# Patient Record
Sex: MALE | Race: WHITE
[De-identification: names, ages, dates, MRNs, and addresses within clinical notes are randomized per-mention and may not be internally consistent; named-entity substitution may affect disease eponyms.]

---

## 2019-04-09 ENCOUNTER — HOSPITAL ENCOUNTER (OUTPATIENT)
Dept: HOSPITAL 15 - RAD HDHVI | Age: 84
Discharge: HOME | End: 2019-04-09
Attending: INTERNAL MEDICINE
Payer: MEDICARE

## 2019-04-09 DIAGNOSIS — D51.9: ICD-10-CM

## 2019-04-09 DIAGNOSIS — E29.1: ICD-10-CM

## 2019-04-09 DIAGNOSIS — N39.0: ICD-10-CM

## 2019-04-09 DIAGNOSIS — E55.9: ICD-10-CM

## 2019-04-09 DIAGNOSIS — Z13.1: Primary | ICD-10-CM

## 2019-04-09 DIAGNOSIS — C61: ICD-10-CM

## 2019-04-09 DIAGNOSIS — E03.9: ICD-10-CM

## 2019-04-09 DIAGNOSIS — I08.0: ICD-10-CM

## 2019-04-09 PROCEDURE — 78452 HT MUSCLE IMAGE SPECT MULT: CPT

## 2019-04-09 PROCEDURE — 82306 VITAMIN D 25 HYDROXY: CPT

## 2019-04-09 PROCEDURE — 96375 TX/PRO/DX INJ NEW DRUG ADDON: CPT

## 2019-04-09 PROCEDURE — 85025 COMPLETE CBC W/AUTO DIFF WBC: CPT

## 2019-04-09 PROCEDURE — 82607 VITAMIN B-12: CPT

## 2019-04-09 PROCEDURE — 84403 ASSAY OF TOTAL TESTOSTERONE: CPT

## 2019-04-09 PROCEDURE — 93005 ELECTROCARDIOGRAM TRACING: CPT

## 2019-04-09 PROCEDURE — 84443 ASSAY THYROID STIM HORMONE: CPT

## 2019-04-09 PROCEDURE — 83036 HEMOGLOBIN GLYCOSYLATED A1C: CPT

## 2019-04-09 PROCEDURE — 84154 ASSAY OF PSA FREE: CPT

## 2019-04-09 PROCEDURE — 81003 URINALYSIS AUTO W/O SCOPE: CPT

## 2019-04-09 PROCEDURE — 80053 COMPREHEN METABOLIC PANEL: CPT

## 2019-04-09 PROCEDURE — 36415 COLL VENOUS BLD VENIPUNCTURE: CPT

## 2019-04-09 PROCEDURE — 96374 THER/PROPH/DIAG INJ IV PUSH: CPT

## 2019-04-09 PROCEDURE — 84153 ASSAY OF PSA TOTAL: CPT

## 2019-04-09 PROCEDURE — 84439 ASSAY OF FREE THYROXINE: CPT

## 2019-04-09 PROCEDURE — 93306 TTE W/DOPPLER COMPLETE: CPT

## 2019-04-09 PROCEDURE — 80061 LIPID PANEL: CPT

## 2019-04-10 LAB
ALBUMIN SERPL-MCNC: 3.8 G/DL (ref 3.4–5)
ALP SERPL-CCNC: 134 U/L (ref 45–117)
ALT SERPL-CCNC: 8 U/L (ref 16–61)
ANION GAP SERPL CALCULATED.3IONS-SCNC: 11 MMOL/L (ref 5–15)
BILIRUB SERPL-MCNC: 1.4 MG/DL (ref 0.2–1)
BUN SERPL-MCNC: 32 MG/DL (ref 7–18)
BUN/CREAT SERPL: 21.1
CALCIUM SERPL-MCNC: 8.8 MG/DL (ref 8.5–10.1)
CHLORIDE SERPL-SCNC: 102 MMOL/L (ref 98–107)
CHOLEST SERPL-MCNC: 115 MG/DL (ref ?–200)
CO2 SERPL-SCNC: 25 MMOL/L (ref 21–32)
GLUCOSE SERPL-MCNC: 86 MG/DL (ref 74–106)
HCT VFR BLD AUTO: 45 % (ref 41–53)
HDLC SERPL-MCNC: 49 MG/DL (ref 40–59)
HGB BLD-MCNC: 14.6 G/DL (ref 13.5–17.5)
MCH RBC QN AUTO: 32.5 PG (ref 28–32)
MCV RBC AUTO: 100.1 FL (ref 80–100)
NRBC BLD QL AUTO: 0.1 %
POTASSIUM SERPL-SCNC: 4.2 MMOL/L (ref 3.5–5.1)
PROT SERPL-MCNC: 7.2 G/DL (ref 6.4–8.2)
PSA SERPL-MCNC: 9.99 NG/ML (ref 0–4)
SODIUM SERPL-SCNC: 138 MMOL/L (ref 136–145)
T4 FREE SERPL-MCNC: 1.28 NG/DL (ref 0.89–1.76)
TRIGL SERPL-MCNC: 64 MG/DL (ref ?–150)

## 2019-06-14 ENCOUNTER — HOSPITAL ENCOUNTER (INPATIENT)
Dept: HOSPITAL 15 - ER | Age: 84
LOS: 7 days | Discharge: HOME | End: 2019-06-21
Payer: MEDICARE

## 2019-06-14 DIAGNOSIS — G20: ICD-10-CM

## 2019-06-14 DIAGNOSIS — A41.9: Primary | ICD-10-CM

## 2019-06-14 DIAGNOSIS — E86.9: ICD-10-CM

## 2019-06-14 DIAGNOSIS — I95.9: ICD-10-CM

## 2019-06-14 DIAGNOSIS — N39.0: ICD-10-CM

## 2019-06-14 DIAGNOSIS — I48.91: ICD-10-CM

## 2019-06-14 DIAGNOSIS — N17.9: ICD-10-CM

## 2019-06-14 DIAGNOSIS — G90.9: ICD-10-CM

## 2019-07-06 ENCOUNTER — HOSPITAL ENCOUNTER (INPATIENT)
Dept: HOSPITAL 15 - ER | Age: 84
LOS: 5 days | Discharge: HOME HEALTH | DRG: 871 | End: 2019-07-11
Attending: FAMILY MEDICINE | Admitting: INTERNAL MEDICINE
Payer: MEDICARE

## 2019-07-06 VITALS — SYSTOLIC BLOOD PRESSURE: 159 MMHG | DIASTOLIC BLOOD PRESSURE: 94 MMHG

## 2019-07-06 VITALS — BODY MASS INDEX: 25.93 KG/M2 | HEIGHT: 69 IN | WEIGHT: 175.05 LBS

## 2019-07-06 DIAGNOSIS — N39.0: ICD-10-CM

## 2019-07-06 DIAGNOSIS — F02.80: ICD-10-CM

## 2019-07-06 DIAGNOSIS — Z79.01: ICD-10-CM

## 2019-07-06 DIAGNOSIS — Z80.1: ICD-10-CM

## 2019-07-06 DIAGNOSIS — Z90.49: ICD-10-CM

## 2019-07-06 DIAGNOSIS — N40.0: ICD-10-CM

## 2019-07-06 DIAGNOSIS — I70.0: ICD-10-CM

## 2019-07-06 DIAGNOSIS — I11.0: ICD-10-CM

## 2019-07-06 DIAGNOSIS — Z80.3: ICD-10-CM

## 2019-07-06 DIAGNOSIS — N32.0: ICD-10-CM

## 2019-07-06 DIAGNOSIS — A41.9: Primary | ICD-10-CM

## 2019-07-06 DIAGNOSIS — G93.41: ICD-10-CM

## 2019-07-06 DIAGNOSIS — G20: ICD-10-CM

## 2019-07-06 DIAGNOSIS — J44.9: ICD-10-CM

## 2019-07-06 DIAGNOSIS — I48.91: ICD-10-CM

## 2019-07-06 DIAGNOSIS — Z74.01: ICD-10-CM

## 2019-07-06 DIAGNOSIS — K46.9: ICD-10-CM

## 2019-07-06 DIAGNOSIS — E86.9: ICD-10-CM

## 2019-07-06 DIAGNOSIS — I50.9: ICD-10-CM

## 2019-07-06 DIAGNOSIS — N13.8: ICD-10-CM

## 2019-07-06 DIAGNOSIS — N40.1: ICD-10-CM

## 2019-07-06 DIAGNOSIS — I25.2: ICD-10-CM

## 2019-07-06 DIAGNOSIS — Z86.73: ICD-10-CM

## 2019-07-06 DIAGNOSIS — Z82.49: ICD-10-CM

## 2019-07-06 DIAGNOSIS — Z88.5: ICD-10-CM

## 2019-07-06 LAB
ALBUMIN SERPL-MCNC: 3.4 G/DL (ref 3.4–5)
ALP SERPL-CCNC: 121 U/L (ref 45–117)
ALT SERPL-CCNC: 6 U/L (ref 16–61)
ANION GAP SERPL CALCULATED.3IONS-SCNC: 7 MMOL/L (ref 5–15)
APTT PPP: 31.9 SEC (ref 23.64–32.05)
BILIRUB SERPL-MCNC: 1.9 MG/DL (ref 0.2–1)
BUN SERPL-MCNC: 23 MG/DL (ref 7–18)
BUN/CREAT SERPL: 14.6
CALCIUM SERPL-MCNC: 8.8 MG/DL (ref 8.5–10.1)
CHLORIDE SERPL-SCNC: 108 MMOL/L (ref 98–107)
CO2 SERPL-SCNC: 21 MMOL/L (ref 21–32)
GLUCOSE SERPL-MCNC: 81 MG/DL (ref 74–106)
HCT VFR BLD AUTO: 44 % (ref 41–53)
HGB BLD-MCNC: 14.4 G/DL (ref 13.5–17.5)
INR PPP: 1.04 (ref 0.9–1.15)
MAGNESIUM SERPL-MCNC: 2.2 MG/DL (ref 1.6–2.6)
MCH RBC QN AUTO: 32 PG (ref 28–32)
MCV RBC AUTO: 97.9 FL (ref 80–100)
NRBC BLD QL AUTO: 0 %
POTASSIUM SERPL-SCNC: 4.2 MMOL/L (ref 3.5–5.1)
PROT SERPL-MCNC: 6.6 G/DL (ref 6.4–8.2)
SODIUM SERPL-SCNC: 136 MMOL/L (ref 136–145)
WBC CLUMPS UR QL AUTO: PRESENT /HPF

## 2019-07-06 PROCEDURE — 85610 PROTHROMBIN TIME: CPT

## 2019-07-06 PROCEDURE — 94761 N-INVAS EAR/PLS OXIMETRY MLT: CPT

## 2019-07-06 PROCEDURE — 80053 COMPREHEN METABOLIC PANEL: CPT

## 2019-07-06 PROCEDURE — 87086 URINE CULTURE/COLONY COUNT: CPT

## 2019-07-06 PROCEDURE — 74176 CT ABD & PELVIS W/O CONTRAST: CPT

## 2019-07-06 PROCEDURE — 71045 X-RAY EXAM CHEST 1 VIEW: CPT

## 2019-07-06 PROCEDURE — 82962 GLUCOSE BLOOD TEST: CPT

## 2019-07-06 PROCEDURE — 83605 ASSAY OF LACTIC ACID: CPT

## 2019-07-06 PROCEDURE — 87040 BLOOD CULTURE FOR BACTERIA: CPT

## 2019-07-06 PROCEDURE — 85730 THROMBOPLASTIN TIME PARTIAL: CPT

## 2019-07-06 PROCEDURE — 84484 ASSAY OF TROPONIN QUANT: CPT

## 2019-07-06 PROCEDURE — 93005 ELECTROCARDIOGRAM TRACING: CPT

## 2019-07-06 PROCEDURE — 83735 ASSAY OF MAGNESIUM: CPT

## 2019-07-06 PROCEDURE — 96374 THER/PROPH/DIAG INJ IV PUSH: CPT

## 2019-07-06 PROCEDURE — 85025 COMPLETE CBC W/AUTO DIFF WBC: CPT

## 2019-07-06 PROCEDURE — 81001 URINALYSIS AUTO W/SCOPE: CPT

## 2019-07-06 PROCEDURE — 51702 INSERT TEMP BLADDER CATH: CPT

## 2019-07-06 PROCEDURE — 36415 COLL VENOUS BLD VENIPUNCTURE: CPT

## 2019-07-06 PROCEDURE — 70450 CT HEAD/BRAIN W/O DYE: CPT

## 2019-07-06 NOTE — NUR
Patient's wife called, after giving the password she was updated on patient's status. She 
provided the list of medications the patient currently takes at home to complete the 
medication reconciliation.

## 2019-07-06 NOTE — NUR
Telemetry admit from [facility] admitted to Telemetry unit after SBAR received from Komal.  Patient oriented 
to JED COBIAN RN primary RN, unit, room, bed, and unit policies regarding patient care 
and visiting hours. Patient now on continuous telemetry monitoring, tele box # 28 and 
telemetry reading on arrival to unit is  A-fib. Pandey is in place, hung below bladder and 
draining candelario colored urine with blood noted. Patient is alert to self only, no complaints 
of pain or S/S of SOB or distress. Sitter is at bedside for safety precautions. Patient 
weighed by bedscale and encouraged to call if they need something. All questions and 
concerns addressed, patient verbalized understanding.

## 2019-07-06 NOTE — NUR
I called after hours pharmacy because the medications were not showing up in the medication 
pyxis and was told it was an IT issue.

## 2019-07-06 NOTE — NUR
Renewed the medications on the orders and called after hours pharmacy to ask them to verify 
the orders, they stated they will work on it but it may take a bit because they are helping 
a few people before me who are also having issues.

## 2019-07-07 VITALS — SYSTOLIC BLOOD PRESSURE: 132 MMHG | DIASTOLIC BLOOD PRESSURE: 75 MMHG

## 2019-07-07 VITALS — DIASTOLIC BLOOD PRESSURE: 85 MMHG | SYSTOLIC BLOOD PRESSURE: 133 MMHG

## 2019-07-07 VITALS — SYSTOLIC BLOOD PRESSURE: 139 MMHG | DIASTOLIC BLOOD PRESSURE: 69 MMHG

## 2019-07-07 VITALS — SYSTOLIC BLOOD PRESSURE: 121 MMHG | DIASTOLIC BLOOD PRESSURE: 66 MMHG

## 2019-07-07 VITALS — DIASTOLIC BLOOD PRESSURE: 81 MMHG | SYSTOLIC BLOOD PRESSURE: 140 MMHG

## 2019-07-07 RX ADMIN — HYDROCODONE BITARTRATE AND ACETAMINOPHEN SCH TAB: 5; 325 TABLET ORAL at 14:57

## 2019-07-07 RX ADMIN — PANTOPRAZOLE SODIUM SCH MG: 40 TABLET, DELAYED RELEASE ORAL at 05:49

## 2019-07-07 RX ADMIN — METOPROLOL SUCCINATE SCH MG: 50 TABLET, EXTENDED RELEASE ORAL at 10:34

## 2019-07-07 RX ADMIN — HYDROCODONE BITARTRATE AND ACETAMINOPHEN SCH TAB: 5; 325 TABLET ORAL at 05:46

## 2019-07-07 RX ADMIN — MEGESTROL ACETATE SCH MG: 20 TABLET ORAL at 21:29

## 2019-07-07 RX ADMIN — AMIODARONE HYDROCHLORIDE SCH MG: 200 TABLET ORAL at 10:00

## 2019-07-07 RX ADMIN — HYDROCODONE BITARTRATE AND ACETAMINOPHEN SCH TAB: 5; 325 TABLET ORAL at 21:30

## 2019-07-07 RX ADMIN — ALLOPURINOL SCH MG: 100 TABLET ORAL at 10:34

## 2019-07-07 RX ADMIN — PRAVASTATIN SODIUM SCH MG: 20 TABLET ORAL at 21:30

## 2019-07-07 RX ADMIN — Medication SCH TAB: at 05:47

## 2019-07-07 RX ADMIN — CEFTRIAXONE SODIUM SCH MLS/HR: 1 INJECTION, POWDER, FOR SOLUTION INTRAMUSCULAR; INTRAVENOUS at 18:55

## 2019-07-07 RX ADMIN — MEGESTROL ACETATE SCH MG: 20 TABLET ORAL at 10:00

## 2019-07-07 RX ADMIN — Medication SCH TAB: at 18:00

## 2019-07-07 RX ADMIN — PRAMIPEXOLE DIHYDROCHLORIDE SCH MG: 0.25 TABLET ORAL at 21:29

## 2019-07-07 RX ADMIN — TAMSULOSIN HYDROCHLORIDE SCH MG: 0.4 CAPSULE ORAL at 10:00

## 2019-07-07 RX ADMIN — FINASTERIDE SCH MG: 5 TABLET, FILM COATED ORAL at 10:33

## 2019-07-07 RX ADMIN — Medication SCH TAB: at 21:29

## 2019-07-07 RX ADMIN — Medication SCH TAB: at 12:37

## 2019-07-07 NOTE — NUR
Two wound care photos taken of right arm. Paperwork was placed in central because I used the 
central wing wound care camera.

## 2019-07-07 NOTE — NUR
Opening Shift Note

Assumed care of patient, awake and alert to self. Sitter is at bedside for safety. Pandey is 
in place, hung below the bladder and draining bloody urine. No S/S of distress/SOB or pain. 
Instructed on POC and to call for assist PRN, will continue to monitor for changes Q1hr and 
PRN.

## 2019-07-08 VITALS — SYSTOLIC BLOOD PRESSURE: 155 MMHG | DIASTOLIC BLOOD PRESSURE: 90 MMHG

## 2019-07-08 VITALS — SYSTOLIC BLOOD PRESSURE: 126 MMHG | DIASTOLIC BLOOD PRESSURE: 90 MMHG

## 2019-07-08 VITALS — SYSTOLIC BLOOD PRESSURE: 104 MMHG | DIASTOLIC BLOOD PRESSURE: 59 MMHG

## 2019-07-08 VITALS — SYSTOLIC BLOOD PRESSURE: 146 MMHG | DIASTOLIC BLOOD PRESSURE: 73 MMHG

## 2019-07-08 VITALS — DIASTOLIC BLOOD PRESSURE: 62 MMHG | SYSTOLIC BLOOD PRESSURE: 128 MMHG

## 2019-07-08 RX ADMIN — HYDROCODONE BITARTRATE AND ACETAMINOPHEN SCH TAB: 5; 325 TABLET ORAL at 06:00

## 2019-07-08 RX ADMIN — HYDROCODONE BITARTRATE AND ACETAMINOPHEN SCH TAB: 5; 325 TABLET ORAL at 21:47

## 2019-07-08 RX ADMIN — Medication SCH TAB: at 21:44

## 2019-07-08 RX ADMIN — PRAVASTATIN SODIUM SCH MG: 20 TABLET ORAL at 21:44

## 2019-07-08 RX ADMIN — PRAMIPEXOLE DIHYDROCHLORIDE SCH MG: 0.25 TABLET ORAL at 21:43

## 2019-07-08 RX ADMIN — HYDROCODONE BITARTRATE AND ACETAMINOPHEN SCH TAB: 5; 325 TABLET ORAL at 13:03

## 2019-07-08 RX ADMIN — MEGESTROL ACETATE SCH MG: 20 TABLET ORAL at 10:44

## 2019-07-08 RX ADMIN — AMIODARONE HYDROCHLORIDE SCH MG: 200 TABLET ORAL at 10:43

## 2019-07-08 RX ADMIN — FINASTERIDE SCH MG: 5 TABLET, FILM COATED ORAL at 10:44

## 2019-07-08 RX ADMIN — SODIUM CHLORIDE SCH MLS/HR: 0.9 INJECTION, SOLUTION INTRAVENOUS at 13:03

## 2019-07-08 RX ADMIN — METOPROLOL SUCCINATE SCH MG: 50 TABLET, EXTENDED RELEASE ORAL at 10:46

## 2019-07-08 RX ADMIN — Medication SCH TAB: at 13:02

## 2019-07-08 RX ADMIN — Medication SCH TAB: at 18:34

## 2019-07-08 RX ADMIN — ALLOPURINOL SCH MG: 100 TABLET ORAL at 10:44

## 2019-07-08 RX ADMIN — PANTOPRAZOLE SODIUM SCH MG: 40 TABLET, DELAYED RELEASE ORAL at 06:26

## 2019-07-08 RX ADMIN — Medication SCH TAB: at 06:24

## 2019-07-08 RX ADMIN — CEFTRIAXONE SODIUM SCH MLS/HR: 1 INJECTION, POWDER, FOR SOLUTION INTRAMUSCULAR; INTRAVENOUS at 10:43

## 2019-07-08 RX ADMIN — SODIUM CHLORIDE SCH MLS/HR: 0.9 INJECTION, SOLUTION INTRAVENOUS at 01:35

## 2019-07-08 RX ADMIN — TAMSULOSIN HYDROCHLORIDE SCH MG: 0.4 CAPSULE ORAL at 10:45

## 2019-07-08 RX ADMIN — MEGESTROL ACETATE SCH MG: 20 TABLET ORAL at 21:43

## 2019-07-08 NOTE — NUR
HYGIENE CARE PROVIDED. 

HYDRA-GUARD WITH OPTIFOAM DRESSING APPLIED TO SACRAL BLANCHABLE ERYTHEMA (BONY PROMINENCE). 

COMFORTABLE ENVIRONMENT PROVIDED, CALL LIGHT WITHIN REACH. SITTER AT BEDSIDE.

## 2019-07-08 NOTE — NUR
PT AWAKE, ALERT, ORIENTED TO PERSON, , PLACE. PT DENIES DISCOMFORT AT MOMENT. EFFORTLESS 
BREATHING ON ROOM AIR. IV PRESENT AND INFUSING WELL TO LFA#22. VIRAMONTES CATHETER IN PLACE, 
SECURED, FREE OF KINKS, PATENT WITH DARK JEYSON URINE IN COLLECTING BAG. BED LOCKED AND IN 
LOWEST POSITION, CALL LIGHT WITHIN REACH, PT UNDER CLOSE OBSERVATION. WILL CONTINUE TO 
MONITOR.

## 2019-07-08 NOTE — NUR
assessment

Patient is a 83 year old male who is confused. Per patients wife Ngozi patient lived home 
with her and their son prior to admission. Per Ngozi she and her son assist patient at home. 
 Per Ngozi patient has a hospital bed, wheelchair, and fww for home use. Patients PCP is Dr Gracia. Patient is on service with [facility]. Patient will need a resumption 
order for home health on discharge. Ngozi verbalized understanding and agreed to discharge 
plan home on discharge. 

-------------------------------------------------------------------------------

Addendum: 07/10/19 at 1600 by Erica DANIELLE

-------------------------------------------------------------------------------

Amended: Links added.

## 2019-07-09 VITALS — SYSTOLIC BLOOD PRESSURE: 131 MMHG | DIASTOLIC BLOOD PRESSURE: 78 MMHG

## 2019-07-09 VITALS — SYSTOLIC BLOOD PRESSURE: 105 MMHG | DIASTOLIC BLOOD PRESSURE: 49 MMHG

## 2019-07-09 VITALS — SYSTOLIC BLOOD PRESSURE: 155 MMHG | DIASTOLIC BLOOD PRESSURE: 77 MMHG

## 2019-07-09 VITALS — SYSTOLIC BLOOD PRESSURE: 153 MMHG | DIASTOLIC BLOOD PRESSURE: 78 MMHG

## 2019-07-09 VITALS — DIASTOLIC BLOOD PRESSURE: 58 MMHG | SYSTOLIC BLOOD PRESSURE: 112 MMHG

## 2019-07-09 RX ADMIN — AMIODARONE HYDROCHLORIDE SCH MG: 200 TABLET ORAL at 10:00

## 2019-07-09 RX ADMIN — CEFTRIAXONE SODIUM SCH MLS/HR: 1 INJECTION, POWDER, FOR SOLUTION INTRAMUSCULAR; INTRAVENOUS at 12:13

## 2019-07-09 RX ADMIN — METOPROLOL SUCCINATE SCH MG: 50 TABLET, EXTENDED RELEASE ORAL at 10:00

## 2019-07-09 RX ADMIN — PANTOPRAZOLE SODIUM SCH MG: 40 TABLET, DELAYED RELEASE ORAL at 06:53

## 2019-07-09 RX ADMIN — Medication SCH TAB: at 18:18

## 2019-07-09 RX ADMIN — TAMSULOSIN HYDROCHLORIDE SCH MG: 0.4 CAPSULE ORAL at 10:00

## 2019-07-09 RX ADMIN — HYDROCODONE BITARTRATE AND ACETAMINOPHEN SCH TAB: 5; 325 TABLET ORAL at 21:27

## 2019-07-09 RX ADMIN — ALLOPURINOL SCH MG: 100 TABLET ORAL at 12:15

## 2019-07-09 RX ADMIN — PRAVASTATIN SODIUM SCH MG: 20 TABLET ORAL at 21:37

## 2019-07-09 RX ADMIN — MEGESTROL ACETATE SCH MG: 20 TABLET ORAL at 12:14

## 2019-07-09 RX ADMIN — Medication SCH TAB: at 12:15

## 2019-07-09 RX ADMIN — SODIUM CHLORIDE SCH MLS/HR: 0.9 INJECTION, SOLUTION INTRAVENOUS at 17:31

## 2019-07-09 RX ADMIN — Medication SCH TAB: at 06:53

## 2019-07-09 RX ADMIN — Medication SCH TAB: at 21:26

## 2019-07-09 RX ADMIN — SODIUM CHLORIDE SCH MLS/HR: 0.9 INJECTION, SOLUTION INTRAVENOUS at 03:57

## 2019-07-09 RX ADMIN — PRAMIPEXOLE DIHYDROCHLORIDE SCH MG: 0.25 TABLET ORAL at 21:37

## 2019-07-09 RX ADMIN — FINASTERIDE SCH MG: 5 TABLET, FILM COATED ORAL at 12:14

## 2019-07-09 RX ADMIN — HYDROCODONE BITARTRATE AND ACETAMINOPHEN SCH TAB: 5; 325 TABLET ORAL at 14:00

## 2019-07-09 RX ADMIN — HYDROCODONE BITARTRATE AND ACETAMINOPHEN SCH TAB: 5; 325 TABLET ORAL at 06:00

## 2019-07-09 RX ADMIN — MEGESTROL ACETATE SCH MG: 20 TABLET ORAL at 21:26

## 2019-07-09 NOTE — NUR
Per  consult for home health. Information and choice letter was given to Pt. Pt requested 
[facility]. Pt agrees and understand d/c plan. Contacted [facility] Ph: ( [phone]) Fax: 
( [phone]) faxed medical records. Per John from [facility] Pt has been accepted and 
service to start within 48 hours upon d/c. Informed LAISHA Millan. 

-------------------------------------------------------------------------------

Addendum: 07/09/19 at 1723 by MUSA SMITH 

-------------------------------------------------------------------------------

Amended: Links added.

## 2019-07-09 NOTE — NUR
Opening Shift Note

Assumed care of patient, awake, alert and oriented X2. No S/S of distress/SOB or pain. Tele# 
28, sinus rhythm @ 95 bpm. IV to left forearm, 22 gauge, patent and infusing 0.9% NS @ 75 
ml/hr. Urethral Pandey catheter, draining clear, yellow urine to gravity. Instructed on POC 
and to call for assist PRN, verbalized understanding. Patient is aspiration precautions. Bed 
locked, in lowest position, call light within reach, will continue to monitor for changes 
Q1hr and PRN.

## 2019-07-09 NOTE — NUR
Opening Shift Note

Assumed care of patient, awake and alert.  No S/S of distress/SOB or pain.  Instructed on 
POC and to call for assist PRN, will continue to monitor for changes Q1hr and PRN.Pandey 
cath. in placed draining yellow urine output min. hematuria.

## 2019-07-10 VITALS — DIASTOLIC BLOOD PRESSURE: 68 MMHG | SYSTOLIC BLOOD PRESSURE: 117 MMHG

## 2019-07-10 VITALS — SYSTOLIC BLOOD PRESSURE: 137 MMHG | DIASTOLIC BLOOD PRESSURE: 76 MMHG

## 2019-07-10 VITALS — DIASTOLIC BLOOD PRESSURE: 78 MMHG | SYSTOLIC BLOOD PRESSURE: 130 MMHG

## 2019-07-10 VITALS — DIASTOLIC BLOOD PRESSURE: 80 MMHG | SYSTOLIC BLOOD PRESSURE: 140 MMHG

## 2019-07-10 VITALS — DIASTOLIC BLOOD PRESSURE: 74 MMHG | SYSTOLIC BLOOD PRESSURE: 133 MMHG

## 2019-07-10 RX ADMIN — HYDROCODONE BITARTRATE AND ACETAMINOPHEN SCH TAB: 5; 325 TABLET ORAL at 05:31

## 2019-07-10 RX ADMIN — MEGESTROL ACETATE SCH MG: 20 TABLET ORAL at 10:02

## 2019-07-10 RX ADMIN — SODIUM CHLORIDE SCH MLS/HR: 0.9 INJECTION, SOLUTION INTRAVENOUS at 22:45

## 2019-07-10 RX ADMIN — AMIODARONE HYDROCHLORIDE SCH MG: 200 TABLET ORAL at 10:02

## 2019-07-10 RX ADMIN — TAMSULOSIN HYDROCHLORIDE SCH MG: 0.4 CAPSULE ORAL at 10:02

## 2019-07-10 RX ADMIN — Medication SCH TAB: at 05:30

## 2019-07-10 RX ADMIN — PRAMIPEXOLE DIHYDROCHLORIDE SCH MG: 0.25 TABLET ORAL at 22:38

## 2019-07-10 RX ADMIN — MEGESTROL ACETATE SCH MG: 20 TABLET ORAL at 22:38

## 2019-07-10 RX ADMIN — ALLOPURINOL SCH MG: 100 TABLET ORAL at 10:05

## 2019-07-10 RX ADMIN — HYDROCODONE BITARTRATE AND ACETAMINOPHEN SCH TAB: 5; 325 TABLET ORAL at 13:32

## 2019-07-10 RX ADMIN — Medication SCH TAB: at 22:39

## 2019-07-10 RX ADMIN — Medication SCH TAB: at 18:17

## 2019-07-10 RX ADMIN — SODIUM CHLORIDE SCH MLS/HR: 0.9 INJECTION, SOLUTION INTRAVENOUS at 06:03

## 2019-07-10 RX ADMIN — PRAVASTATIN SODIUM SCH MG: 20 TABLET ORAL at 22:38

## 2019-07-10 RX ADMIN — Medication SCH TAB: at 12:14

## 2019-07-10 RX ADMIN — METOPROLOL SUCCINATE SCH MG: 50 TABLET, EXTENDED RELEASE ORAL at 10:03

## 2019-07-10 RX ADMIN — PANTOPRAZOLE SODIUM SCH MG: 40 TABLET, DELAYED RELEASE ORAL at 05:30

## 2019-07-10 RX ADMIN — CEFTRIAXONE SODIUM SCH MLS/HR: 1 INJECTION, POWDER, FOR SOLUTION INTRAMUSCULAR; INTRAVENOUS at 10:01

## 2019-07-10 RX ADMIN — HYDROCODONE BITARTRATE AND ACETAMINOPHEN SCH TAB: 5; 325 TABLET ORAL at 22:39

## 2019-07-10 RX ADMIN — FINASTERIDE SCH MG: 5 TABLET, FILM COATED ORAL at 10:02

## 2019-07-10 NOTE — NUR
Nutrition Assessment Notes



please see attached link for complete assessment



Est. Needs BW [id number] kcal (25-30 kcal/kgBW),  60-75 gms pro (0.8-1.0 gms/kgBW r/t 
elev RFT). Will continue to monitor pertinent labs and reassess nutrient need prn




-------------------------------------------------------------------------------

Addendum: 07/10/19 at 1218 by Pamela Preston RD

-------------------------------------------------------------------------------

Amended: Links added.

## 2019-07-10 NOTE — NUR
IV removal

IV DC'd to left forearm with sterile technique, catheter fully intact. Pressure dressing 
applied to site.Patient tolerated procedure well.  Discharged with aftercare instructions 
per MD.

IV insertion

IV access obtained, via clean sterile technique by inserting 22 gauge catheter at right hand 
after 3 attempt(s). IV secured properly. No trauma to site. Patient tolerated procedure 
well.

## 2019-07-10 NOTE — NUR
MD Called/paged

Dr. Gracia called re:request for breathing treatment order for wheezing hx of copd saturation 
95%on 2L NC . Waiting for call back. Continue care.

## 2019-07-10 NOTE — NUR
Opening Shift Note

Assumed care of patient, awake, alert and oriented X2, to self and year. No S/S of 
distress/SOB or pain. Tele# 28, A-Fib @ 76 bpm. IV to left forearm, 22 gauge, patent and 
infusing 0.9$ NS @ 75 ml/hr. Urethral Pandey catheter draining clear, light candelario urine to 
gravity. Instructed on POC and to call for assist PRN, verbalized understanding but requires 
frequent reminding and reorienting. Bed locked, in lowest position, call light within reach, 
sitter remains at bedside for patient safety, will continue to monitor for changes Q1hr and 
PRN.

## 2019-07-10 NOTE — NUR
UROLOGY

JENELLE Orlando at bedside for Urology consult, new orders received and followed through. Patient 
updated on plan of care, verbalized understanding.

## 2019-07-10 NOTE — NUR
WOUND CARE NOTE: ADDED PATIENT TO SKIN INTEGRITY MONITORING AT THIS TIME, D/T LOW BRENT 
SCOR 11. PATIENT WAS ADMITED TO [facility] WITH DIAGNOSIS OF HEMATURIA.  CURRENT BRENT SCORE IS 
11.  PATIENT RECENTLY AMBULATED IN HALLWAY WITH ASSISTANCE BY PHYSICAL THERAPY. HE IS WOUND 
FREE AT THIS TIME.  SKIN/WOUND CARE PLAN IMPLEMENTED.  PATIENT SHOULD BE TURNED/REPOSITIONED 
Q 2 HOURS, PRN WHEN IN BED, WITH PRESSURE REDISTRIBUTION USING PILLOWS/WEDGES, BID/PRN 
APPLICATION WITH MOISTURE BARRIER CREAM, OPTIFOAM GENTLE SACRAL DRESSING AS PREVENTATIVE, 
SKIN/WOUND CARE PLAN, DIETARY CONSULT FOR LOW BRENT 11, CONTINUED MONITORING BY WOUND CARE 
TEAM.

## 2019-07-10 NOTE — NUR
ROUNDS

Dr RAJESH Martinez at bedside for rounds. New orders received and followed through. Patient and wife 
at bedside updated on plan of care, verbalized understanding.

## 2019-07-10 NOTE — NUR
ABD/PELVIC CT

Awaiting abd/pelvic CT results completed @ [id number]. Spoke to Radha in radiology, informed we 
need the report, per Radha, she will inform the US tech to contact the department.

## 2019-07-11 VITALS — SYSTOLIC BLOOD PRESSURE: 153 MMHG | DIASTOLIC BLOOD PRESSURE: 88 MMHG

## 2019-07-11 VITALS — SYSTOLIC BLOOD PRESSURE: 163 MMHG | DIASTOLIC BLOOD PRESSURE: 85 MMHG

## 2019-07-11 VITALS — DIASTOLIC BLOOD PRESSURE: 72 MMHG | SYSTOLIC BLOOD PRESSURE: 124 MMHG

## 2019-07-11 RX ADMIN — METOPROLOL SUCCINATE SCH MG: 50 TABLET, EXTENDED RELEASE ORAL at 09:15

## 2019-07-11 RX ADMIN — HYDROCODONE BITARTRATE AND ACETAMINOPHEN SCH TAB: 5; 325 TABLET ORAL at 06:00

## 2019-07-11 RX ADMIN — CEFTRIAXONE SODIUM SCH MLS/HR: 1 INJECTION, POWDER, FOR SOLUTION INTRAMUSCULAR; INTRAVENOUS at 09:14

## 2019-07-11 RX ADMIN — PANTOPRAZOLE SODIUM SCH MG: 40 TABLET, DELAYED RELEASE ORAL at 06:43

## 2019-07-11 RX ADMIN — Medication SCH TAB: at 14:30

## 2019-07-11 RX ADMIN — SODIUM CHLORIDE SCH MLS/HR: 0.9 INJECTION, SOLUTION INTRAVENOUS at 06:44

## 2019-07-11 RX ADMIN — AMIODARONE HYDROCHLORIDE SCH MG: 200 TABLET ORAL at 09:15

## 2019-07-11 RX ADMIN — TAMSULOSIN HYDROCHLORIDE SCH MG: 0.4 CAPSULE ORAL at 09:14

## 2019-07-11 RX ADMIN — Medication SCH TAB: at 06:43

## 2019-07-11 RX ADMIN — FINASTERIDE SCH MG: 5 TABLET, FILM COATED ORAL at 09:14

## 2019-07-11 RX ADMIN — MEGESTROL ACETATE SCH MG: 20 TABLET ORAL at 09:14

## 2019-07-11 RX ADMIN — ALLOPURINOL SCH MG: 100 TABLET ORAL at 09:15

## 2019-07-11 NOTE — NUR
OPENING NOTE 

ASSUMED CARE OF PATIENT. PATIENT ALERT AND ORIENTED TO SELF. NO SINGS OF SOB/DISTRESS. BED 
SET TO LOWEST POSITION/LOCKED. BEDSIDE RAILS UP X2. BED ALARM ON. CALL LIGHT WITHIN REACH. 
WILL CONTINUE TO MONITOR Q1HR AND PRN.

## 2019-07-11 NOTE — NUR
Discharge instructions and paperwork given as MD ordered. Patient is to follow-up with Dr. Gracia on 08/07/19 @ 2:50PM. [id number], [place] [address]. [phone]. All 
questions and concerns addressed with patients wife. Wife verbalized understanding.  IV 
removed with catheter intact, pressure dressing applied.  Telemetry unit returned to ICU.

## 2019-08-13 ENCOUNTER — HOSPITAL ENCOUNTER (EMERGENCY)
Dept: HOSPITAL 15 - ER | Age: 84
LOS: 1 days | Discharge: NURSING, REHAB OR LONG-TERM CARE | End: 2019-08-14
Payer: MEDICARE

## 2019-08-13 VITALS — HEIGHT: 70 IN | BODY MASS INDEX: 32.93 KG/M2 | WEIGHT: 230 LBS

## 2019-08-13 DIAGNOSIS — N39.0: ICD-10-CM

## 2019-08-13 DIAGNOSIS — I25.2: ICD-10-CM

## 2019-08-13 DIAGNOSIS — J45.901: Primary | ICD-10-CM

## 2019-08-13 DIAGNOSIS — E78.5: ICD-10-CM

## 2019-08-13 DIAGNOSIS — I48.91: ICD-10-CM

## 2019-08-13 DIAGNOSIS — Z88.5: ICD-10-CM

## 2019-08-13 DIAGNOSIS — I50.9: ICD-10-CM

## 2019-08-13 DIAGNOSIS — F03.90: ICD-10-CM

## 2019-08-13 DIAGNOSIS — I11.0: ICD-10-CM

## 2019-08-13 DIAGNOSIS — Z79.899: ICD-10-CM

## 2019-08-13 LAB
ALBUMIN SERPL-MCNC: 2.8 G/DL (ref 3.4–5)
ALP SERPL-CCNC: 68 U/L (ref 45–117)
ALT SERPL-CCNC: < 6 U/L (ref 16–61)
ANION GAP SERPL CALCULATED.3IONS-SCNC: 7 MMOL/L (ref 5–15)
APTT PPP: 28.2 SEC (ref 23.64–32.05)
BILIRUB SERPL-MCNC: 0.8 MG/DL (ref 0.2–1)
BUN SERPL-MCNC: 27 MG/DL (ref 7–18)
BUN/CREAT SERPL: 19.7
CALCIUM SERPL-MCNC: 8.2 MG/DL (ref 8.5–10.1)
CHLORIDE SERPL-SCNC: 113 MMOL/L (ref 98–107)
CO2 SERPL-SCNC: 24 MMOL/L (ref 21–32)
GLUCOSE SERPL-MCNC: 93 MG/DL (ref 74–106)
HCT VFR BLD AUTO: 38.2 % (ref 41–53)
HGB BLD-MCNC: 12.5 G/DL (ref 13.5–17.5)
INR PPP: 0.97 (ref 0.9–1.15)
MCH RBC QN AUTO: 31.6 PG (ref 28–32)
MCV RBC AUTO: 96.5 FL (ref 80–100)
NRBC BLD QL AUTO: 0 %
POTASSIUM SERPL-SCNC: 4 MMOL/L (ref 3.5–5.1)
PROT SERPL-MCNC: 5.7 G/DL (ref 6.4–8.2)
SODIUM SERPL-SCNC: 144 MMOL/L (ref 136–145)

## 2019-08-13 PROCEDURE — 71250 CT THORAX DX C-: CPT

## 2019-08-13 PROCEDURE — 93005 ELECTROCARDIOGRAM TRACING: CPT

## 2019-08-13 PROCEDURE — 99284 EMERGENCY DEPT VISIT MOD MDM: CPT

## 2019-08-13 PROCEDURE — 85730 THROMBOPLASTIN TIME PARTIAL: CPT

## 2019-08-13 PROCEDURE — 87086 URINE CULTURE/COLONY COUNT: CPT

## 2019-08-13 PROCEDURE — 70450 CT HEAD/BRAIN W/O DYE: CPT

## 2019-08-13 PROCEDURE — 84484 ASSAY OF TROPONIN QUANT: CPT

## 2019-08-13 PROCEDURE — 81001 URINALYSIS AUTO W/SCOPE: CPT

## 2019-08-13 PROCEDURE — 83880 ASSAY OF NATRIURETIC PEPTIDE: CPT

## 2019-08-13 PROCEDURE — 80053 COMPREHEN METABOLIC PANEL: CPT

## 2019-08-13 PROCEDURE — 94640 AIRWAY INHALATION TREATMENT: CPT

## 2019-08-13 PROCEDURE — 36415 COLL VENOUS BLD VENIPUNCTURE: CPT

## 2019-08-13 PROCEDURE — 72125 CT NECK SPINE W/O DYE: CPT

## 2019-08-13 PROCEDURE — 85025 COMPLETE CBC W/AUTO DIFF WBC: CPT

## 2019-08-13 PROCEDURE — 85610 PROTHROMBIN TIME: CPT

## 2019-08-14 VITALS — SYSTOLIC BLOOD PRESSURE: 132 MMHG | DIASTOLIC BLOOD PRESSURE: 73 MMHG
